# Patient Record
Sex: MALE | Race: WHITE | Employment: FULL TIME | URBAN - METROPOLITAN AREA
[De-identification: names, ages, dates, MRNs, and addresses within clinical notes are randomized per-mention and may not be internally consistent; named-entity substitution may affect disease eponyms.]

---

## 2021-09-10 ENCOUNTER — HOSPITAL ENCOUNTER (EMERGENCY)
Age: 31
Discharge: HOME | End: 2021-09-10
Attending: EMERGENCY MEDICINE
Payer: COMMERCIAL

## 2021-09-10 ENCOUNTER — APPOINTMENT (OUTPATIENT)
Dept: GENERAL RADIOLOGY | Age: 31
End: 2021-09-10
Payer: COMMERCIAL

## 2021-09-10 VITALS
RESPIRATION RATE: 19 BRPM | DIASTOLIC BLOOD PRESSURE: 94 MMHG | WEIGHT: 285 LBS | BODY MASS INDEX: 32.97 KG/M2 | OXYGEN SATURATION: 99 % | SYSTOLIC BLOOD PRESSURE: 152 MMHG | HEART RATE: 66 BPM | TEMPERATURE: 97.5 F | HEIGHT: 78 IN

## 2021-09-10 DIAGNOSIS — S61.219A FINGER LACERATION, INITIAL ENCOUNTER: Primary | ICD-10-CM

## 2021-09-10 PROCEDURE — 6370000000 HC RX 637 (ALT 250 FOR IP): Performed by: STUDENT IN AN ORGANIZED HEALTH CARE EDUCATION/TRAINING PROGRAM

## 2021-09-10 PROCEDURE — 12002 RPR S/N/AX/GEN/TRNK2.6-7.5CM: CPT

## 2021-09-10 PROCEDURE — 90715 TDAP VACCINE 7 YRS/> IM: CPT | Performed by: STUDENT IN AN ORGANIZED HEALTH CARE EDUCATION/TRAINING PROGRAM

## 2021-09-10 PROCEDURE — 99283 EMERGENCY DEPT VISIT LOW MDM: CPT

## 2021-09-10 PROCEDURE — 73130 X-RAY EXAM OF HAND: CPT

## 2021-09-10 PROCEDURE — 90471 IMMUNIZATION ADMIN: CPT | Performed by: STUDENT IN AN ORGANIZED HEALTH CARE EDUCATION/TRAINING PROGRAM

## 2021-09-10 PROCEDURE — 6360000002 HC RX W HCPCS: Performed by: STUDENT IN AN ORGANIZED HEALTH CARE EDUCATION/TRAINING PROGRAM

## 2021-09-10 RX ORDER — LIDOCAINE HYDROCHLORIDE 10 MG/ML
20 INJECTION, SOLUTION INFILTRATION; PERINEURAL ONCE
Status: DISCONTINUED | OUTPATIENT
Start: 2021-09-10 | End: 2021-09-10 | Stop reason: HOSPADM

## 2021-09-10 RX ORDER — NAPROXEN 250 MG/1
500 TABLET ORAL ONCE
Status: COMPLETED | OUTPATIENT
Start: 2021-09-10 | End: 2021-09-10

## 2021-09-10 RX ORDER — CEPHALEXIN 500 MG/1
500 CAPSULE ORAL 4 TIMES DAILY
Qty: 28 CAPSULE | Refills: 0 | Status: SHIPPED | OUTPATIENT
Start: 2021-09-10

## 2021-09-10 RX ORDER — ACETAMINOPHEN 500 MG
1000 TABLET ORAL ONCE
Status: COMPLETED | OUTPATIENT
Start: 2021-09-10 | End: 2021-09-10

## 2021-09-10 RX ORDER — CEPHALEXIN 500 MG/1
500 CAPSULE ORAL ONCE
Status: COMPLETED | OUTPATIENT
Start: 2021-09-10 | End: 2021-09-10

## 2021-09-10 RX ADMIN — CEPHALEXIN 500 MG: 500 CAPSULE ORAL at 19:31

## 2021-09-10 RX ADMIN — TETANUS TOXOID, REDUCED DIPHTHERIA TOXOID AND ACELLULAR PERTUSSIS VACCINE, ADSORBED 0.5 ML: 5; 2.5; 8; 8; 2.5 SUSPENSION INTRAMUSCULAR at 19:31

## 2021-09-10 RX ADMIN — ACETAMINOPHEN 1000 MG: 500 TABLET ORAL at 19:31

## 2021-09-10 RX ADMIN — NAPROXEN 500 MG: 250 TABLET ORAL at 19:31

## 2021-09-10 ASSESSMENT — PAIN SCALES - GENERAL
PAINLEVEL_OUTOF10: 5
PAINLEVEL_OUTOF10: 5

## 2021-09-10 ASSESSMENT — ENCOUNTER SYMPTOMS
RHINORRHEA: 0
SORE THROAT: 0
ABDOMINAL PAIN: 0
SHORTNESS OF BREATH: 0
PHOTOPHOBIA: 0

## 2021-09-10 NOTE — LETTER
[facility]  [address] [facility] [address]  Phone: [phone]         September 10, 2021     Patient: Wang Wei   YOB: 1990   Date of Visit: 9/10/2021       To Whom It May Concern:    Wang Wei was seen and treated in our emergency department on 9/10/2021. The following work duties are recommended. He may return to work on 9/11/2021    Treatment and work recommendations given by the emergency department are initial emergency measures only, and follow up should be arranged as soon as possible with the company's occupational health provider* or the specialist to whom the worker was referred. *If the company does not have an occupational health provider, follow up should be at [facility]  [address] [place] [address]  [phone]    If symptoms worsen, As needed, For suture removal in 7 days    [address] [id number] [phone]  Call today  To establish a primary care physician and for follow-up and reevaluation in 1 to 2 days.     [address] [facility] [address]  [phone]  Schedule an appointment as soon as possible for a visit             Sincerely,        Venkat Hernandez RN        Signature:__________________________________

## 2021-09-10 NOTE — ED PROVIDER NOTES
[address]  ED  Emergency Department Encounter  EmergencyMedicine Resident     Pt Name:Lance Samuels  MRN: [id number]  [place] 1990  Date of evaluation: 9/10/21  PCP:  No primary care provider on file. This patient was evaluated in the Emergency Department for symptoms described in the history of present illness. The patient was evaluated in the context of the global COVID-19 pandemic, which necessitated consideration that the patient might be at risk for infection with the SARS-CoV-2 virus that causes COVID-19. Institutional protocols and algorithms that pertain to the evaluation of patients at risk for COVID-19 are in a state of rapid change based on information released by regulatory bodies including the CDC and federal and state organizations. These policies and algorithms were followed during the patient's care in the ED. CHIEF COMPLAINT       Chief Complaint   Patient presents with    Laceration     pt stated he cut his fingers on razor blade while trying to cut a zip tie pain 5/10, C/O numbness       HISTORY OF PRESENT ILLNESS  (Location/Symptom, Timing/Onset, Context/Setting, Quality, Duration, Modifying Factors, Severity.)      Moisés Jenkins is a 36 y.o. male who presents with plaint of a laceration to digits 2, 3 4 of the right hand. Patient states that he was cutting a zip tie with a razor blade knife when he slipped and he sliced those fingers. There is injury to the volar aspect of the distal tips of each of those fingers bleeding is relatively controlled at this point sensation is intact denies any other injuries unclear when last tetanus shot was. PAST MEDICAL / SURGICAL / SOCIAL / FAMILY HISTORY      has no past medical history on file. Asked and none     has no past surgical history on file.   Asked and none    Social History     Socioeconomic History    Marital status: Not on file     Spouse name: Not on file    Number of children: Not on file    Years of education: Not on file    Highest education level: Not on file   Occupational History    Not on file   Tobacco Use    Smoking status: Not on file   Substance and Sexual Activity    Alcohol use: Not on file    Drug use: Not on file    Sexual activity: Not on file   Other Topics Concern    Not on file   Social History Narrative    Not on file     Social Determinants of Health     Financial Resource Strain:     Difficulty of Paying Living Expenses:    Food Insecurity:     Worried About Running Out of Food in the Last Year:     [address] in the Last Year:    Transportation Needs:     Lack of Transportation (Medical):  Lack of Transportation (Non-Medical):    Physical Activity:     Days of Exercise per Week:     Minutes of Exercise per Session:    Stress:     Feeling of Stress :    Social Connections:     Frequency of Communication with Friends and Family:     Frequency of Social Gatherings with Friends and Family:     Attends [employer] Services:     Active Member of Clubs or Organizations:     Attends Club or Organization Meetings:     Marital Status:    Intimate Partner Violence:     Fear of Current or Ex-Partner:     Emotionally Abused:     Physically Abused:     Sexually Abused:        No family history on file. Allergies:  Patient has no allergy information on record. Home Medications:  Prior to Admission medications    Not on File       REVIEW OF SYSTEMS    (2-9 systems for level 4, 10 or more for level 5)      Review of Systems   Constitutional: Negative for fever. HENT: Negative for congestion, rhinorrhea and sore throat. Eyes: Negative for photophobia and visual disturbance. Respiratory: Negative for shortness of breath. Cardiovascular: Negative for chest pain. Gastrointestinal: Negative for abdominal pain. Musculoskeletal: Negative for gait problem. Skin: Positive for wound. Allergic/Immunologic: Negative for environmental allergies and food allergies. Neurological: Negative for syncope, weakness and numbness. Hematological: Does not bruise/bleed easily. Psychiatric/Behavioral: Negative for agitation and confusion. PHYSICAL EXAM   (up to 7 for level 4, 8 or more for level 5)      INITIAL VITALS:   BP (!) 152/94   Pulse 66   Temp 97.5 °F (36.4 °C)   Resp 19   Ht 6' 9\" (2.057 m)   Wt 285 lb (129.3 kg)   SpO2 99%   BMI 30.54 kg/m²     Physical Exam  Vitals reviewed. Constitutional:       General: He is not in acute distress. Appearance: He is obese. He is not toxic-appearing. HENT:      Head: Normocephalic. Right Ear: External ear normal.      Left Ear: External ear normal.      Nose: Nose normal.      Mouth/Throat:      Mouth: Mucous membranes are moist.   Eyes:      Conjunctiva/sclera: Conjunctivae normal.   Cardiovascular:      Rate and Rhythm: Normal rate. Pulses: Normal pulses. Pulmonary:      Effort: Pulmonary effort is normal.   Abdominal:      Palpations: Abdomen is soft. Tenderness: There is no abdominal tenderness. Musculoskeletal:         General: Tenderness and signs of injury present. Normal range of motion. Cervical back: Normal range of motion. Skin:     General: Skin is warm. Capillary Refill: Capillary refill takes less than 2 seconds. Findings: Lesion present. Neurological:      Mental Status: He is alert and oriented to person, place, and time.    Psychiatric:         Mood and Affect: Mood normal.         DIFFERENTIAL  DIAGNOSIS     PLAN (LABS / IMAGING / EKG):  Orders Placed This Encounter   Procedures    XR HAND RIGHT (MIN 3 VIEWS)       MEDICATIONS ORDERED:  Orders Placed This Encounter   Medications    DISCONTD: lidocaine 1 % injection 20 mL    Tetanus-Diphth-Acell Pertussis (BOOSTRIX) injection 0.5 mL    acetaminophen (TYLENOL) tablet 1,000 mg    naproxen (NAPROSYN) tablet 500 mg    cephALEXin (KEFLEX) capsule 500 mg     Order Specific Question:   Antimicrobial Indications Answer:   Skin and Soft Tissue Infection    cephALEXin (KEFLEX) 500 MG capsule     Sig: Take 1 capsule by mouth 4 times daily     Dispense:  28 capsule     Refill:  0       DDX: laceration    DIAGNOSTIC RESULTS / EMERGENCY DEPARTMENT COURSE / MDM   LAB RESULTS:  No results found for this visit on 09/10/21. IMPRESSION: Is alert oriented obese nontoxic 82-year-old male no acute distress ambidextrous but preference for the left hand who was slicing a zip tie with a razor blade when he cut the volar aspect of digits 2 3 and 4 of his right hand bleeding is controlled no ligamentous injury full range of motion sensation and cap refill are intact distally no snuffbox tenderness no wrist tenderness no other injuries plan will be updating tetanus x-ray irrigation closure antibiotics outpatient follow-up    RADIOLOGY:  XR HAND RIGHT (MIN 3 VIEWS)    Result Date: 9/10/2021  EXAMINATION: THREE XRAY VIEWS OF THE RIGHT HAND 9/10/2021 7:42 pm COMPARISON: None HISTORY: ORDERING SYSTEM PROVIDED HISTORY: finger lac eval fb fx TECHNOLOGIST PROVIDED HISTORY: finger lac eval fb fx FINDINGS: No acute fracture. Joints maintain anatomic alignment. Suboptimally seen lacerations in the palmar 2nd, 3rd, and 4th fingers. No soft tissue gas nor radiopaque foreign body. Suboptimally seen lacerations of the palm are right 2nd, 3rd, and 4th fingers with no underlying acute bony abnormality nor radiopaque foreign body.        EKG  none    All EKG's are interpreted by the Emergency Department Physician who either signs or Co-signs this chart in the absence of a cardiologist.    EMERGENCY DEPARTMENT COURSE:  The patient was seen and evaluated extremity demonstrated no acute bony abnormality or retained foreign body there is thoroughly irrigated Betadine and sterile saline under pressure area was sutured closed with simple interrupted sutures see procedure note patient started on antibiotics with Keflex and discharged home in stable condition with outpatient follow-up. PROCEDURES:  PROCEDURE NOTE - LACERATION CLOSURE    PATIENT NAME: Dayne [facility] RECORD NO. [id number]  DATE: 9/10/2021  ATTENDING PHYSICIAN: Caroline Rodriguez    PREOPERATIVE DIAGNOSIS: Laceration(s) as follows:  -Location: volar aspect of digits 3, 4 of right hand  -Length: 3.5 cm  -Layered closure: No    POSTOPERATIVE DIAGNOSIS:  Same  PROCEDURE PERFORMED:  Suture closure of laceration  PERFORMING PHYSICIAN: Keira Tolentino DO  ANESTHESIA:  Local utilizing  Lidocaine 1% without epinephrine  ESTIMATED BLOOD LOSS:  Less than 25 ml. DISCUSSION:  Mirza Ramos is a 36y.o.-year-old male. Patient requires laceration repair. The history and physical examination were reviewed and confirmed. CONSENT: The patient provided verbal consent for this procedure. PROCEDURE:  Prior to starting, the procedure and patient were confirmed by those present. The wound area was irrigated with sterile saline, cleansed with povidone iodine and draped in a sterile fashion. The wound area was anesthetized with Lidocaine 1% without epinephrine. The wound was explored with the following results No foreign bodies found. The wound was repaired with 4-0 Prolene using interrupted sutures. The wound was dressed with bacitracin and a sterile dressing. All sponge, instrument and needle counts were correct at the completion of the procedure. The patient tolerated the procedure well. SUTURE COUNT:  Suture count: 9    COMPLICATIONS:  None    Keira Tolentino DO  11:59 PM, 9/10/21    CONSULTS:  None    CRITICAL CARE:  none    FINAL IMPRESSION      1.  Finger laceration, initial encounter          DISPOSITION / PLAN     DISPOSITION  dc with script for keflex 500 bid x 7 days    PATIENT REFERRED TO:  [facility]  [address] [facility] [address] [phone]    If symptoms worsen, As needed, For suture removal in 7 days    [address]  [address] [place] [address] [address] [facility] [place] [address] [phone]  Call today  To establish a primary care physician and for follow-up and reevaluation in 1 to 2 days. [address] [address]  [phone]  Schedule an appointment as soon as possible for a visit         DISCHARGE MEDICATIONS:  There are no discharge medications for this patient.       Jens Velasquez DO  Emergency Medicine Resident    (Please note that portions of thisnote were completed with a voice recognition program.  Efforts were made to edit the dictations but occasionally words are mis-transcribed.)        Jens Velasquez DO  Resident  09/11/21 0001

## 2021-09-10 NOTE — ED NOTES
This patient was assessed by the doctor only. Nurse processed and completed the orders from this doctor ie labs, meds, and/or EKG.         Dorene Shafer RN  09/10/21 1795

## 2021-09-10 NOTE — ED PROVIDER NOTES
[address] [facility]     Emergency Department     Faculty Note/ Attestation      Pt Name: Jodie Garcia                                       MRN: [id number]  [place] 1990  Date of evaluation: 9/10/2021    Patients PCP:    No primary care provider on file. Attestation  I performed a history and physical examination of the patient and discussed management with the resident. I reviewed the residents note and agree with the documented findings and plan of care. Any areas of disagreement are noted on the chart. I was personally present for the key portions of any procedures. I have documented in the chart those procedures where I was not present during the key portions. I have reviewed the emergency nurses triage note. I agree with the chief complaint, past medical history, past surgical history, allergies, medications, social and family history as documented unless otherwise noted below. For Physician Assistant/ Nurse Practitioner cases/documentation I have personally evaluated this patient and have completed at least one if not all key elements of the E/M (history, physical exam, and MDM). Additional findings are as noted. Initial Screens:             Vitals: There were no vitals filed for this visit. CHIEF COMPLAINT       Chief Complaint   Patient presents with    Laceration     pt stated he cut his fingers on razor blade while trying to cut a zip tie pain 5/10, C/O numbness             DIAGNOSTIC RESULTS             RADIOLOGY:   XR HAND RIGHT (MIN 3 VIEWS)    (Results Pending)         LABS:  Labs Reviewed - No data to display      EMERGENCY DEPARTMENT COURSE:     -------------------------   ,  ,  ,        Comments    L hand dominant  Lac to fingers with razor  Unclear Td status  blding controlled    X-ray is reassuring, thorough exploration of the wound through full range of motion shows no deep tissue injuries, no visible tendons, bleeding controlled.   Wound sutured per resident note, patient tolerated well. Will apply topical antibiotic ointment, patient return in 5 to 7 days for wound check and potential suture removal.  He will need to follow-up with hand clinic.   Also given Keflex due to the location of the injury    Tetanus was updated    (Please note that portions of this note were completed with a voice recognition program.  Efforts were made to edit the dictations but occasionally words are mis-transcribed.)      Eduar Locke MD,, MD  Attending Emergency Physician         Eduar Locke MD  09/11/21 2262